# Patient Record
Sex: FEMALE | NOT HISPANIC OR LATINO | Employment: OTHER | URBAN - METROPOLITAN AREA
[De-identification: names, ages, dates, MRNs, and addresses within clinical notes are randomized per-mention and may not be internally consistent; named-entity substitution may affect disease eponyms.]

---

## 2017-11-25 ENCOUNTER — HOSPITAL ENCOUNTER (EMERGENCY)
Facility: HOSPITAL | Age: 27
Discharge: HOME | End: 2017-11-25
Attending: EMERGENCY MEDICINE
Payer: COMMERCIAL

## 2017-11-25 VITALS
DIASTOLIC BLOOD PRESSURE: 65 MMHG | HEIGHT: 64 IN | WEIGHT: 164 LBS | RESPIRATION RATE: 18 BRPM | OXYGEN SATURATION: 98 % | SYSTOLIC BLOOD PRESSURE: 107 MMHG | TEMPERATURE: 98.4 F | HEART RATE: 88 BPM | BODY MASS INDEX: 28 KG/M2

## 2017-11-25 DIAGNOSIS — B34.9 VIRAL SYNDROME: Primary | ICD-10-CM

## 2017-11-25 LAB
ANION GAP SERPL CALCULATED.3IONS-SCNC: 12 MMOL/L (ref 4–13)
BUN SERPL-MCNC: 11 MG/DL (ref 5–25)
CALCIUM SERPL-MCNC: 8.4 MG/DL (ref 8.3–10.1)
CHLORIDE SERPL-SCNC: 102 MMOL/L (ref 100–108)
CO2 SERPL-SCNC: 23 MMOL/L (ref 21–32)
CREAT SERPL-MCNC: 0.67 MG/DL (ref 0.6–1.3)
ERYTHROCYTE [DISTWIDTH] IN BLOOD BY AUTOMATED COUNT: 11.9 % (ref 11.6–15.1)
GFR SERPL CREATININE-BSD FRML MDRD: 119 ML/MIN/1.73SQ M
GLUCOSE SERPL-MCNC: 99 MG/DL (ref 65–140)
HCT VFR BLD AUTO: 41.1 % (ref 37–47)
HGB BLD-MCNC: 14.1 G/DL (ref 12–16)
LIPASE SERPL-CCNC: 86 U/L (ref 73–393)
LYMPHOCYTES # BLD AUTO: 0.3 THOUSAND/UL (ref 0.6–4.47)
LYMPHOCYTES # BLD AUTO: 3 %
MCH RBC QN AUTO: 30 PG (ref 27–31)
MCHC RBC AUTO-ENTMCNC: 34.4 G/DL (ref 31.4–37.4)
MCV RBC AUTO: 87 FL (ref 82–98)
MONOCYTES # BLD AUTO: 0.61 THOUSAND/UL (ref 0–1.22)
MONOCYTES NFR BLD AUTO: 6 % (ref 4–12)
NEUTS BAND NFR BLD MANUAL: 23 % (ref 0–8)
NEUTS SEG # BLD: 9.19 THOUSAND/UL (ref 1.81–6.82)
NEUTS SEG NFR BLD AUTO: 68 %
PLATELET # BLD AUTO: 237 THOUSANDS/UL (ref 130–400)
PMV BLD AUTO: 8.6 FL (ref 8.9–12.7)
POTASSIUM SERPL-SCNC: 3.7 MMOL/L (ref 3.5–5.3)
RBC # BLD AUTO: 4.72 MILLION/UL (ref 4.2–5.4)
SODIUM SERPL-SCNC: 137 MMOL/L (ref 136–145)
TOTAL CELLS COUNTED SPEC: 100
WBC # BLD AUTO: 10.1 THOUSAND/UL (ref 4.8–10.8)

## 2017-11-25 PROCEDURE — 85007 BL SMEAR W/DIFF WBC COUNT: CPT | Performed by: EMERGENCY MEDICINE

## 2017-11-25 PROCEDURE — 85027 COMPLETE CBC AUTOMATED: CPT | Performed by: EMERGENCY MEDICINE

## 2017-11-25 PROCEDURE — 36415 COLL VENOUS BLD VENIPUNCTURE: CPT | Performed by: EMERGENCY MEDICINE

## 2017-11-25 PROCEDURE — 96361 HYDRATE IV INFUSION ADD-ON: CPT

## 2017-11-25 PROCEDURE — 96374 THER/PROPH/DIAG INJ IV PUSH: CPT

## 2017-11-25 PROCEDURE — 80048 BASIC METABOLIC PNL TOTAL CA: CPT | Performed by: EMERGENCY MEDICINE

## 2017-11-25 PROCEDURE — 83690 ASSAY OF LIPASE: CPT | Performed by: EMERGENCY MEDICINE

## 2017-11-25 PROCEDURE — 99284 EMERGENCY DEPT VISIT MOD MDM: CPT

## 2017-11-25 RX ORDER — ONDANSETRON 2 MG/ML
4 INJECTION INTRAMUSCULAR; INTRAVENOUS ONCE
Status: COMPLETED | OUTPATIENT
Start: 2017-11-25 | End: 2017-11-25

## 2017-11-25 RX ORDER — ONDANSETRON 4 MG/1
4 TABLET, FILM COATED ORAL EVERY 6 HOURS
Qty: 12 TABLET | Refills: 0 | Status: SHIPPED | OUTPATIENT
Start: 2017-11-25 | End: 2017-12-30

## 2017-11-25 RX ADMIN — SODIUM CHLORIDE 1000 ML: 0.9 INJECTION, SOLUTION INTRAVENOUS at 18:56

## 2017-11-25 RX ADMIN — ONDANSETRON 4 MG: 2 INJECTION INTRAMUSCULAR; INTRAVENOUS at 18:56

## 2017-11-25 NOTE — ED PROVIDER NOTES
History  Chief Complaint   Patient presents with    Diarrhea     Patient started with vomiting and diarrhea this am     had stomach virus last week  Patient is  LMP was oct 4th 2017 + pregnancy test 2 weeks ago  75-year-old female presents to the ED with 2 days of nausea vomiting diarrhea  Patient states that her  had this last week now she feels she has gotten at  She is concerned because she is pregnant  Patient is awake and alert mentating well afebrile  No other complaints        History provided by:  Patient and spouse      None       History reviewed  No pertinent past medical history  History reviewed  No pertinent surgical history  History reviewed  No pertinent family history  I have reviewed and agree with the history as documented  Social History   Substance Use Topics    Smoking status: Former Smoker    Smokeless tobacco: Never Used    Alcohol use No        Review of Systems   Constitutional: Negative for activity change, chills, diaphoresis and fever  HENT: Negative for congestion, ear pain, nosebleeds, sore throat, trouble swallowing and voice change  Eyes: Negative for pain, discharge and redness  Respiratory: Negative for apnea, cough, choking, shortness of breath, wheezing and stridor  Cardiovascular: Negative for chest pain and palpitations  Gastrointestinal: Positive for abdominal pain, diarrhea, nausea and vomiting  Negative for abdominal distention and constipation  Endocrine: Negative for polydipsia  Genitourinary: Negative for difficulty urinating, dysuria, flank pain, frequency, hematuria and urgency  Musculoskeletal: Negative for back pain, gait problem, joint swelling, myalgias, neck pain and neck stiffness  Skin: Negative for pallor and rash  Neurological: Negative for dizziness, tremors, syncope, speech difficulty, weakness, numbness and headaches  Hematological: Negative for adenopathy     Psychiatric/Behavioral: Negative for confusion, hallucinations, self-injury and suicidal ideas  The patient is not nervous/anxious  Physical Exam  ED Triage Vitals   Temperature Pulse Respirations Blood Pressure SpO2   11/25/17 1821 11/25/17 1824 11/25/17 1824 11/25/17 1824 11/25/17 1824   98 4 °F (36 9 °C) 88 18 107/65 98 %      Temp src Heart Rate Source Patient Position - Orthostatic VS BP Location FiO2 (%)   -- -- -- -- --             Pain Score       11/25/17 1821       No Pain           Orthostatic Vital Signs  Vitals:    11/25/17 1824   BP: 107/65   Pulse: 88       Physical Exam   Constitutional: She is oriented to person, place, and time  Vital signs are normal  She appears well-developed and well-nourished  HENT:   Head: Normocephalic and atraumatic  Eyes: EOM are normal  Pupils are equal, round, and reactive to light  Neck: Normal range of motion  Neck supple  Cardiovascular: Normal rate, regular rhythm, normal heart sounds and intact distal pulses  Pulmonary/Chest: Effort normal and breath sounds normal    Abdominal: Soft  Bowel sounds are normal    Patient does not have any tenderness upon palpation to her abdomen however states she does feel nauseous when palpating   Musculoskeletal: Normal range of motion  Neurological: She is alert and oriented to person, place, and time  Skin: Skin is warm  Psychiatric: She has a normal mood and affect  Nursing note and vitals reviewed        ED Medications  Medications   sodium chloride 0 9 % bolus 1,000 mL (0 mL Intravenous Stopped 11/25/17 2001)   ondansetron (ZOFRAN) injection 4 mg (4 mg Intravenous Given 11/25/17 1856)       Diagnostic Studies  Results Reviewed     Procedure Component Value Units Date/Time    CBC and differential [[id number]]  (Abnormal) Collected:  11/25/17 1856    Lab Status:  Final result Specimen:  Blood from Arm, Left Updated:  11/25/17 1933     WBC 10 10 Thousand/uL      RBC 4 72 Million/uL      Hemoglobin 14 1 g/dL      Hematocrit 41 1 % MCV 87 fL      MCH 30 0 pg      MCHC 34 4 g/dL      RDW 11 9 %      MPV 8 6 (L) fL      Platelets 727 Thousands/uL     Basic metabolic panel [[id number]] Collected:  11/25/17 1856    Lab Status:  Final result Specimen:  Blood from Arm, Left Updated:  11/25/17 1924     Sodium 137 mmol/L      Potassium 3 7 mmol/L      Chloride 102 mmol/L      CO2 23 mmol/L      Anion Gap 12 mmol/L      BUN 11 mg/dL      Creatinine 0 67 mg/dL      Glucose 99 mg/dL      Calcium 8 4 mg/dL      eGFR 119 ml/min/1 73sq m     Narrative:         National Kidney Disease Education Program recommendations are as follows:  GFR calculation is accurate only with a steady state creatinine  Chronic Kidney disease less than 60 ml/min/1 73 sq  meters  Kidney failure less than 15 ml/min/1 73 sq  meters  Lipase [[id number]]  (Normal) Collected:  11/25/17 1856    Lab Status:  Final result Specimen:  Blood from Arm, Left Updated:  11/25/17 1924     Lipase 86 u/L     UA w Reflex to Microscopic [[id number]]     Lab Status:  No result Specimen:  Urine     POCT pregnancy, urine [[id number]]     Lab Status:  No result                  No orders to display              Procedures  Procedures       Phone Contacts  ED Phone Contact    ED Course  ED Course as of Nov 25 2016   Sat Nov 25, 2017   1931 Anion Gap: 12                               MDM  Number of Diagnoses or Management Options  Diagnosis management comments: Patient feels better after the fluid  I advised keep a close eye if she starts getting fevers increased diarrhea increased pain to immediately return to the emergency department  Patient and  are in agreement      CritCare Time    Disposition  Final diagnoses:   Viral syndrome     Time reflects when diagnosis was documented in both MDM as applicable and the Disposition within this note     Time User Action Codes Description Comment    11/25/2017  8:15 PM Ofe Davenport Add [B34 9] Viral syndrome       ED Disposition     ED Disposition Condition Comment    Discharge  Leora Wade discharge to home/self care  Condition at discharge: Stable        Follow-up Information     Follow up With Specialties Details Why Contact Info    Bruno Rock   As needed [address] [id number] [phone]          Patient's Medications   Discharge Prescriptions    ONDANSETRON (ZOFRAN) 4 MG TABLET    Take 1 tablet by mouth every 6 (six) hours       Start Date: 11/25/2017End Date: --       Order Dose: 4 mg       Quantity: 12 tablet    Refills: 0     No discharge procedures on file      ED Provider  Electronically Signed by           Beto Cesar DO  11/25/17 2016

## 2017-11-26 NOTE — ED NOTES
Patient is resting comfortably  Unable to give urine at this time       Shelby Solano RN  11/25/17 2002

## 2017-11-26 NOTE — DISCHARGE INSTRUCTIONS
Viral Syndrome, Ambulatory Care   GENERAL INFORMATION:   Viral syndrome  is a term healthcare providers use for general symptoms of a viral infection that has no clear cause  Common symptoms include the following:   · Fever and chills, or a rash    · Runny or stuffy nose     · Cough, sore throat, or hoarseness     · Headache, or pain and pressure around your eyes     · Muscle aches and joint pain     · Shortness of breath or wheezing     · Abdominal pain, cramps, and diarrhea     · Nausea, vomiting, or loss of appetite  Seek immediate care for the following symptoms:   · Continued vomiting and diarrhea    · Chest pain or trouble breathing  Treatment for a viral syndrome  may include medicines to decrease fever, cough, or stuffy nose  You may also need medicines to relieve a rash, itching, or help treat the viral infection  Manage your symptoms:  Drink liquids as directed to help prevent dehydration  Ask how much liquid to drink each day and which liquids are best for you  You may need to drink an oral rehydration solution (ORS)  An ORS has the right amounts of water, salts, and sugar you need to replace body fluids  Prevent the spread of viral syndrome:   · Wash your hands often  Use soap and water  Wash your hands after you use the bathroom, change a child's diapers, or sneeze  Wash your hands before you prepare or eat food  Use a gel hand  if soap and water are not available  · Wear a mask  to help prevent spreading the virus to others  · Cook and handle food properly  Cook food completely through  Clean food preparation surfaces with a disinfectant  · Ask about vaccinations  You may need an influenza (flu) vaccine, pneumococcal vaccine, or meningococcal vaccine  These vaccines help prevent the flu, pneumonia, and meningococcal disease  Follow up with your healthcare provider as directed:  Write down your questions so you remember to ask them during your visits     CARE AGREEMENT:   You

## 2017-12-30 ENCOUNTER — APPOINTMENT (EMERGENCY)
Dept: RADIOLOGY | Facility: HOSPITAL | Age: 27
End: 2017-12-30
Payer: COMMERCIAL

## 2017-12-30 ENCOUNTER — HOSPITAL ENCOUNTER (EMERGENCY)
Facility: HOSPITAL | Age: 27
Discharge: HOME | End: 2017-12-30
Attending: EMERGENCY MEDICINE | Admitting: EMERGENCY MEDICINE
Payer: COMMERCIAL

## 2017-12-30 VITALS
HEART RATE: 78 BPM | DIASTOLIC BLOOD PRESSURE: 59 MMHG | OXYGEN SATURATION: 100 % | TEMPERATURE: 98.3 F | RESPIRATION RATE: 16 BRPM | SYSTOLIC BLOOD PRESSURE: 115 MMHG | BODY MASS INDEX: 29.01 KG/M2 | WEIGHT: 169 LBS

## 2017-12-30 DIAGNOSIS — S61.210A LACERATION OF RIGHT INDEX FINGER: Primary | ICD-10-CM

## 2017-12-30 PROCEDURE — 73140 X-RAY EXAM OF FINGER(S): CPT

## 2017-12-30 PROCEDURE — 99283 EMERGENCY DEPT VISIT LOW MDM: CPT

## 2017-12-30 PROCEDURE — 90471 IMMUNIZATION ADMIN: CPT

## 2017-12-30 PROCEDURE — 90715 TDAP VACCINE 7 YRS/> IM: CPT | Performed by: EMERGENCY MEDICINE

## 2017-12-30 RX ORDER — LIDOCAINE HYDROCHLORIDE 20 MG/ML
5 INJECTION, SOLUTION EPIDURAL; INFILTRATION; INTRACAUDAL; PERINEURAL ONCE
Status: COMPLETED | OUTPATIENT
Start: 2017-12-30 | End: 2017-12-30

## 2017-12-30 RX ORDER — GINSENG 100 MG
1 CAPSULE ORAL ONCE
Status: COMPLETED | OUTPATIENT
Start: 2017-12-30 | End: 2017-12-30

## 2017-12-30 RX ADMIN — BACITRACIN ZINC 1 SMALL APPLICATION: 500 OINTMENT TOPICAL at 05:37

## 2017-12-30 RX ADMIN — TETANUS TOXOID, REDUCED DIPHTHERIA TOXOID AND ACELLULAR PERTUSSIS VACCINE, ADSORBED 0.5 ML: 5; 2.5; 8; 8; 2.5 SUSPENSION INTRAMUSCULAR at 04:55

## 2017-12-30 RX ADMIN — LIDOCAINE HYDROCHLORIDE 5 ML: 20 INJECTION, SOLUTION EPIDURAL; INFILTRATION; INTRACAUDAL; PERINEURAL at 04:55

## 2017-12-30 NOTE — ED PROVIDER NOTES
History  Chief Complaint   Patient presents with    Finger Laceration     pt was trying to open a window and cut her right pointer finger on the glass     Pt in ER with c/o finger laceration that occurred while she was attempting to open a window  Symptoms occurred prior to arrival  She is 3mths pregnant and denies pregnancy complications at this time  None       History reviewed  No pertinent past medical history  History reviewed  No pertinent surgical history  History reviewed  No pertinent family history  I have reviewed and agree with the history as documented  Social History   Substance Use Topics    Smoking status: Former Smoker    Smokeless tobacco: Never Used    Alcohol use No        Review of Systems   Skin: Positive for wound  Negative for color change, pallor and rash  All other systems reviewed and are negative  Physical Exam  ED Triage Vitals [12/30/17 0352]   Temperature Pulse Respirations Blood Pressure SpO2   98 3 °F (36 8 °C) 78 16 115/59 100 %      Temp Source Heart Rate Source Patient Position - Orthostatic VS BP Location FiO2 (%)   Oral Monitor Sitting Right arm --      Pain Score       4           Orthostatic Vital Signs  Vitals:    12/30/17 0352   BP: 115/59   Pulse: 78   Patient Position - Orthostatic VS: Sitting       Physical Exam   Constitutional: She appears well-developed and well-nourished  HENT:   Head: Normocephalic and atraumatic  Musculoskeletal:        Arms:  Skin: Skin is warm  Psychiatric: Her affect is blunt  Nursing note and vitals reviewed        ED Medications  Medications   tetanus-diphtheria-acellular pertussis (BOOSTRIX) IM injection 0 5 mL (0 5 mL Intramuscular Given 12/30/17 6243)   lidocaine (PF) (XYLOCAINE-MPF) 2 % injection 5 mL (5 mL Infiltration Given 12/30/17 1595)   bacitracin topical ointment 1 small application (1 small application Topical Given 12/30/17 7356)       Diagnostic Studies  Results Reviewed     None XR finger second digit-index RIGHT    (Results Pending)              Procedures  Lac Repair  Date/Time: 12/30/2017 5:29 AM  Performed by: Courtney Garber by: Danis White     Patient location:  ED and bedside  Other Assisting Provider: No    Consent:     Consent obtained:  Verbal    Consent given by:  Patient    Risks discussed:  Infection, pain, poor cosmetic result, poor wound healing, nerve damage, need for additional repair, retained foreign body, tendon damage and vascular damage    Alternatives discussed:  No treatment  Universal protocol:     Procedure explained and questions answered to patient or proxy's satisfaction: yes      Imaging studies available: yes      Site/side marked: yes      Immediately prior to procedure, a time out was called: yes      Patient identity confirmed:  Verbally with patient  Anesthesia (see MAR for exact dosages):      Anesthesia method:  Local infiltration    Local anesthetic:  Lidocaine 2% w/o epi  Laceration details:     Location:  Finger    Finger location:  R index finger    Length (cm) of Repair:  1 5    Depth (mm):  0 5  Repair type:     Repair type:  Simple  Pre-procedure details:     Preparation:  Patient was prepped and draped in usual sterile fashion and imaging obtained to evaluate for foreign bodies  Exploration:     Hemostasis achieved with:  Direct pressure    Wound exploration: wound explored through full range of motion      Wound extent: no areolar tissue violation noted, no fascia violation noted, no foreign bodies/material noted, no muscle damage noted, no nerve damage noted, no tendon damage noted, no underlying fracture noted and no vascular damage noted      Contaminated: no    Treatment:     Area cleansed with:  Saline    Amount of cleaning:  Extensive    Irrigation solution:  Sterile saline    Irrigation volume:  250    Irrigation method:  Tap    Visualized foreign bodies/material removed: no    Skin repair:     Repair method:  Sutures    Suture size:  4-0    Suture material:  Nylon    Suture technique:  Simple interrupted    Number of sutures:  3  Approximation:     Approximation:  Close    Approximation difficulty:  Simple    Vermilion border: well-aligned    Post-procedure details:     Dressing:  Antibiotic ointment and bulky dressing    Patient tolerance of procedure: Tolerated well, no immediate complications             Phone Contacts  ED Phone Contact    ED Course  ED Course                                MDM  CritCare Time    Disposition  Final diagnoses:   Laceration of right index finger     Time reflects when diagnosis was documented in both MDM as applicable and the Disposition within this note     Time User Action Codes Description Comment    12/30/2017  5:32 AM Josephine Lennon Add [H57 210A] Laceration of right index finger       ED Disposition     ED Disposition Condition Comment    Discharge  Slava Weathers discharge to home/self care  Condition at discharge: Good        Follow-up Information     Follow up With Specialties Details Why Contact Info Additional Information    Danny Rhys  Go in 1 day  [address] Emergency Department Emergency Medicine  For suture removal in 7-10 days [address] [phone] [facility] ED, [place], [place], [address]        There are no discharge medications for this patient  No discharge procedures on file      ED Provider  Electronically Signed by           Governor DO Andrew  12/30/17 2254

## 2017-12-30 NOTE — DISCHARGE INSTRUCTIONS
Finger Laceration   WHAT YOU NEED TO KNOW:   A finger laceration is a deep cut in your skin  It is often caused by a sharp object, such as a knife, or blunt force to your finger  Your blood vessels, bones, joints, tendons, or nerves may also be injured  DISCHARGE INSTRUCTIONS:   Return to the emergency department if:   · Your wound comes apart  · Blood soaks through your bandage  · You have severe pain in your finger or hand  · Your finger is pale and cold  · You have sudden trouble moving your finger  · Your swelling suddenly gets worse  · You have red streaks on your skin coming from your wound  Contact your healthcare provider or hand specialist if:   · You have new numbness or tingling  · Your finger feels warm, looks swollen or red, and is draining pus  · You have a fever  · You have questions or concerns about your condition or care  Medicines: You may  need any of the following:  · Antibiotics  help prevent a bacterial infection  · Acetaminophen  decreases pain and fever  It is available without a doctor's order  Ask how much to take and how often to take it  Follow directions  Read the labels of all other medicines you are using to see if they also contain acetaminophen, or ask your doctor or pharmacist  Acetaminophen can cause liver damage if not taken correctly  Do not use more than 4 grams (4,000 milligrams) total of acetaminophen in one day  · Prescription pain medicine  may be given  Ask your healthcare provider how to take this medicine safely  Some prescription pain medicines contain acetaminophen  Do not take other medicines that contain acetaminophen without talking to your healthcare provider  Too much acetaminophen may cause liver damage  Prescription pain medicine may cause constipation  Ask your healthcare provider how to prevent or treat constipation  · Take your medicine as directed    Contact your healthcare provider if you think your medicine is not helping or if you have side effects  Tell him or her if you are allergic to any medicine  Keep a list of the medicines, vitamins, and herbs you take  Include the amounts, and when and why you take them  Bring the list or the pill bottles to follow-up visits  Carry your medicine list with you in case of an emergency  Self-care:   · Apply ice  on your finger for 15 to 20 minutes every hour or as directed  Use an ice pack, or put crushed ice in a plastic bag  Cover it with a towel before you apply it to your skin  Ice helps prevent tissue damage and decreases swelling and pain  · Elevate  your hand above the level of your heart as often as you can  This will help decrease swelling and pain  Prop your hand on pillows or blankets to keep it elevated comfortably  · Wear your splint as directed  A splint will decrease movement and stress on your wound  The splint may help your wound heal faster  Ask your healthcare provider how to apply and remove a splint  · Apply ointments to decrease scarring  Do not apply ointments until your healthcare provider says it is okay  You may need to wait until your wound is healed  Ask which ointment to buy and how often to use it  Wound care:   · Do not get your wound wet until your healthcare provider says it is okay  Do not soak your hand in water  Do not go swimming until your healthcare provider says it is okay  When your healthcare provider says it is okay, carefully wash around the wound with soap and water  Let soap and water run over your wound  Gently pat the area dry or allow it to air dry  · Change your bandages when they get wet, dirty, or after washing  Apply new, clean bandages as directed  Do not apply elastic bandages or tape too tightly  Do not put powders or lotions on your wound  · Apply antibiotic ointment as directed  Your healthcare provider may give you antibiotic ointment to put over your wound if you have stitches   If you have Strips-Strips over your wound, let them dry up and fall off on their own  If they do not fall off within 14 days, gently remove them  If you have glue over your wound, do not remove or pick at it  If your glue comes off, do not replace it with glue that you have at home  · Check your wound every day for signs of infection  Signs of infection include swelling, redness, or pus  Follow up with your healthcare provider or hand specialist in 2 days:  Write down your questions so you remember to ask them during your visits  © 2017 [address]  Information is for End User's use only and may not be sold, redistributed or otherwise used for commercial purposes  All illustrations and images included in CareNotes® are the copyrighted property of [employer] , [employer]  or Regan Boyle  The above information is an  only  It is not intended as medical advice for individual conditions or treatments  Talk to your doctor, nurse or pharmacist before following any medical regimen to see if it is safe and effective for you